# Patient Record
Sex: MALE | Race: ASIAN | Employment: UNEMPLOYED | URBAN - METROPOLITAN AREA
[De-identification: names, ages, dates, MRNs, and addresses within clinical notes are randomized per-mention and may not be internally consistent; named-entity substitution may affect disease eponyms.]

---

## 2023-05-20 ENCOUNTER — APPOINTMENT (OUTPATIENT)
Dept: CT IMAGING | Facility: HOSPITAL | Age: 23
End: 2023-05-20
Attending: EMERGENCY MEDICINE

## 2023-05-20 ENCOUNTER — HOSPITAL ENCOUNTER (EMERGENCY)
Facility: HOSPITAL | Age: 23
Discharge: HOME | End: 2023-05-20
Attending: EMERGENCY MEDICINE

## 2023-05-20 VITALS
SYSTOLIC BLOOD PRESSURE: 141 MMHG | OXYGEN SATURATION: 99 % | DIASTOLIC BLOOD PRESSURE: 72 MMHG | HEART RATE: 55 BPM | RESPIRATION RATE: 20 BRPM | TEMPERATURE: 99 F

## 2023-05-20 DIAGNOSIS — G40.909 SEIZURE DISORDER (HCC): Primary | ICD-10-CM

## 2023-05-20 LAB
ALBUMIN SERPL-MCNC: 4.9 G/DL (ref 3.4–5)
ALBUMIN/GLOB SERPL: 1.3 {RATIO} (ref 1–2)
ALP LIVER SERPL-CCNC: 117 U/L
ALT SERPL-CCNC: 70 U/L
ANION GAP SERPL CALC-SCNC: 20 MMOL/L (ref 0–18)
AST SERPL-CCNC: 29 U/L (ref 15–37)
BASOPHILS # BLD AUTO: 0.03 X10(3) UL (ref 0–0.2)
BASOPHILS NFR BLD AUTO: 0.3 %
BILIRUB SERPL-MCNC: 0.3 MG/DL (ref 0.1–2)
BUN BLD-MCNC: 21 MG/DL (ref 7–18)
CALCIUM BLD-MCNC: 9.6 MG/DL (ref 8.5–10.1)
CHLORIDE SERPL-SCNC: 107 MMOL/L (ref 98–112)
CO2 SERPL-SCNC: 11 MMOL/L (ref 21–32)
CREAT BLD-MCNC: 1.64 MG/DL
EOSINOPHIL # BLD AUTO: 0.15 X10(3) UL (ref 0–0.7)
EOSINOPHIL NFR BLD AUTO: 1.4 %
ERYTHROCYTE [DISTWIDTH] IN BLOOD BY AUTOMATED COUNT: 11.4 %
GFR SERPLBLD BASED ON 1.73 SQ M-ARVRAT: 60 ML/MIN/1.73M2 (ref 60–?)
GLOBULIN PLAS-MCNC: 3.8 G/DL (ref 2.8–4.4)
GLUCOSE BLD-MCNC: 133 MG/DL (ref 70–99)
GLUCOSE BLD-MCNC: 149 MG/DL (ref 70–99)
HCT VFR BLD AUTO: 54.2 %
HGB BLD-MCNC: 18 G/DL
IMM GRANULOCYTES # BLD AUTO: 0.11 X10(3) UL (ref 0–1)
IMM GRANULOCYTES NFR BLD: 1 %
LYMPHOCYTES # BLD AUTO: 5.27 X10(3) UL (ref 1–4)
LYMPHOCYTES NFR BLD AUTO: 48.4 %
MCH RBC QN AUTO: 31 PG (ref 26–34)
MCHC RBC AUTO-ENTMCNC: 33.2 G/DL (ref 31–37)
MCV RBC AUTO: 93.4 FL
MONOCYTES # BLD AUTO: 0.62 X10(3) UL (ref 0.1–1)
MONOCYTES NFR BLD AUTO: 5.7 %
NEUTROPHILS # BLD AUTO: 4.71 X10 (3) UL (ref 1.5–7.7)
NEUTROPHILS # BLD AUTO: 4.71 X10(3) UL (ref 1.5–7.7)
NEUTROPHILS NFR BLD AUTO: 43.2 %
OSMOLALITY SERPL CALC.SUM OF ELEC: 292 MOSM/KG (ref 275–295)
PLATELET # BLD AUTO: 351 10(3)UL (ref 150–450)
POTASSIUM SERPL-SCNC: 4 MMOL/L (ref 3.5–5.1)
PROT SERPL-MCNC: 8.7 G/DL (ref 6.4–8.2)
RBC # BLD AUTO: 5.8 X10(6)UL
SODIUM SERPL-SCNC: 138 MMOL/L (ref 136–145)
WBC # BLD AUTO: 10.9 X10(3) UL (ref 4–11)

## 2023-05-20 PROCEDURE — 80053 COMPREHEN METABOLIC PANEL: CPT | Performed by: EMERGENCY MEDICINE

## 2023-05-20 PROCEDURE — 96374 THER/PROPH/DIAG INJ IV PUSH: CPT

## 2023-05-20 PROCEDURE — 85025 COMPLETE CBC W/AUTO DIFF WBC: CPT | Performed by: EMERGENCY MEDICINE

## 2023-05-20 PROCEDURE — 99285 EMERGENCY DEPT VISIT HI MDM: CPT

## 2023-05-20 PROCEDURE — 82962 GLUCOSE BLOOD TEST: CPT

## 2023-05-20 PROCEDURE — 70450 CT HEAD/BRAIN W/O DYE: CPT | Performed by: EMERGENCY MEDICINE

## 2023-05-20 RX ORDER — LEVETIRACETAM 500 MG/5ML
INJECTION, SOLUTION, CONCENTRATE INTRAVENOUS
Status: COMPLETED
Start: 2023-05-20 | End: 2023-05-20

## 2023-05-20 RX ORDER — LEVETIRACETAM 750 MG/1
750 TABLET ORAL 2 TIMES DAILY
Qty: 60 TABLET | Refills: 0 | Status: SHIPPED | OUTPATIENT
Start: 2023-05-20 | End: 2023-06-19

## 2023-05-20 RX ORDER — LEVETIRACETAM 500 MG/5ML
1000 INJECTION, SOLUTION, CONCENTRATE INTRAVENOUS ONCE
Status: COMPLETED | OUTPATIENT
Start: 2023-05-20 | End: 2023-05-20

## 2023-05-20 NOTE — PROGRESS NOTES
Parents @ bedside. Per parents, patient took seizure medications from age 1 to age 10. Has not taken medications since age 10. Medication he took was called \"Depakine Chrono\". Mother states patient had a seizure one year ago. Patient does not have a neurologist in [facility]. Language line used for interpretion purposes.  #[id number]

## 2023-05-20 NOTE — ED INITIAL ASSESSMENT (HPI)
Patient to ER via EMS after a possible seizure in his car at the gym parking lot. Witnessed by bystander. Patient believes he might have taken too much pre-workout.

## 2023-06-07 ENCOUNTER — TELEPHONE (OUTPATIENT)
Dept: NEUROLOGY | Facility: CLINIC | Age: 23
End: 2023-06-07

## 2023-06-07 DIAGNOSIS — G40.909 SEIZURE DISORDER (HCC): Primary | ICD-10-CM

## 2023-06-07 RX ORDER — LEVETIRACETAM 750 MG/1
750 TABLET ORAL 2 TIMES DAILY
Qty: 60 TABLET | Refills: 0 | Status: SHIPPED | OUTPATIENT
Start: 2023-06-07 | End: 2023-07-07

## 2023-06-07 NOTE — TELEPHONE ENCOUNTER
Call placed to patients mother via   Ranjit Ferrera #[id number]  Left message for mother regarding testing and central scheduling number. Advised to call back with any questions.

## 2023-06-07 NOTE — TELEPHONE ENCOUNTER
Pts mom calling regarding medication. Pts mom states pt was put on Destiny Iha after having a seizure. Pt has no insurance and no PCP . Pt will run out of medication at the end of this month. Pt offered appt in [facility] on 6/28/23 and accepted.

## 2023-06-07 NOTE — TELEPHONE ENCOUNTER
PT mother states cost of medication can be too much out of pocket pt mother was given CS number to move forward wit MRI order

## 2023-06-28 ENCOUNTER — OFFICE VISIT (OUTPATIENT)
Dept: NEUROLOGY | Facility: CLINIC | Age: 23
End: 2023-06-28

## 2023-06-28 VITALS
SYSTOLIC BLOOD PRESSURE: 122 MMHG | HEART RATE: 64 BPM | DIASTOLIC BLOOD PRESSURE: 70 MMHG | WEIGHT: 209 LBS | RESPIRATION RATE: 16 BRPM

## 2023-06-28 DIAGNOSIS — G40.909 SEIZURE DISORDER (HCC): Primary | ICD-10-CM

## 2023-06-28 DIAGNOSIS — T88.7XXA MEDICATION SIDE EFFECT: ICD-10-CM

## 2023-06-28 PROCEDURE — 99244 OFF/OP CNSLTJ NEW/EST MOD 40: CPT | Performed by: OTHER

## 2023-06-28 RX ORDER — LEVETIRACETAM 500 MG/1
500 TABLET ORAL 2 TIMES DAILY
Qty: 60 TABLET | Refills: 1 | Status: SHIPPED | OUTPATIENT
Start: 2023-06-28

## 2023-09-07 RX ORDER — LEVETIRACETAM 500 MG/1
500 TABLET ORAL 2 TIMES DAILY
Qty: 60 TABLET | Refills: 0 | Status: SHIPPED | OUTPATIENT
Start: 2023-09-07

## 2023-09-07 NOTE — TELEPHONE ENCOUNTER
Medication: LEVETIRACETAM 500 MG Oral Tab     Date of last refill: 08/17/23 (60/0)  [place] 19[place]    Last office visit: 06/28/23  Due back to clinic per last office note:  6 months  Date next office visit scheduled:  No future appointments. Last OV note recommendation:     Plan:  Seizures since infancy, starting with a febrile seizure- initial frequency every 2-3 months, now yearly, but off seizure medication 15 years  Obtain EEG, MRI  Obtain Keppra level  Discussed that despite the fact that his seizure frequency has improved off seizure medications, the goal would be seizure cessation as much as possible   Due to irritability and drowsiness, with decreased appetite, recommend switch to Trileptal. He would not like to switch to a new medication at this time, and would like to try a lower dose of Keppra first. Will try 500mg BID.  Check level in 1 week  Recommend to not drive for 6 months  Discussed seizure precautions, including but not limited to no swimming alone, no baths, no climbing above own height, including ladders, avoid contact sports, wear helmet if biking, no operating heavy machinery  Discussed factors that can lower the seizure threshold

## 2023-10-16 RX ORDER — LEVETIRACETAM 500 MG/1
500 TABLET ORAL 2 TIMES DAILY
Qty: 60 TABLET | Refills: 0 | Status: SHIPPED | OUTPATIENT
Start: 2023-10-16 | End: 2023-11-20

## 2023-10-16 NOTE — TELEPHONE ENCOUNTER
Please see prior refill TE 9/7.   Needs appt and to keep it for further refills. Also did not complete testing.

## 2023-10-16 NOTE — TELEPHONE ENCOUNTER
Medication: LEVETIRACETAM 500 MG Oral Tab     Date of last refill: 09/07/23 (60/0)  [place]    Last office visit: 06/28/23  Due back to clinic per last office note:  6 months  Date next office visit scheduled:  No future appointments.     Last OV note recommendation:     Plan:  Seizures since infancy, starting with a febrile seizure- initial frequency every 2-3 months, now yearly, but off seizure medication 15 years  Obtain EEG, MRI  Obtain Keppra level  Discussed that despite the fact that his seizure frequency has improved off seizure medications, the goal would be seizure cessation as much as possible   Due to irritability and drowsiness, with decreased appetite, recommend switch to Trileptal. He would not like to switch to a new medication at this time, and would like to try a lower dose of Keppra first. Will try 500mg BID. Check level in 1 week  Recommend to not drive for 6 months  Discussed seizure precautions, including but not limited to no swimming alone, no baths, no climbing above own height, including ladders, avoid contact sports, wear helmet if biking, no operating heavy machinery  Discussed factors that can lower the seizure threshold

## 2023-10-17 NOTE — TELEPHONE ENCOUNTER
Patient will be due for follow up appointment at the end of December, but doesn't have anything scheduled.  Per provider request, contacted patient using the telephone  in [place] and explained that we need to schedule an appointment for him so we can continue to refill his seizure medication.  Patient understood but states that he needs to discuss with his parents, and requests that our office call him back tomorrow mid-morning (10/18/23 around 10:30am) to schedule.    Routing to  to call tomorrow to schedule.

## 2023-10-25 NOTE — TELEPHONE ENCOUNTER
Second attempt- Spoke with pt with [employer] pt did opt of of scheduling follow up appt per Te below pt stated he cannot afford another self pay visit and is requesting to be advised by nursing or DO for next best options.

## 2023-10-26 NOTE — TELEPHONE ENCOUNTER
Agree with health dept, [facility] if he cannot obtain insurance or some sort of assistance program, consider social work consult if that is available to him.     He has not completed blood test, MRI or EEG that I requested. I cannot refill his medication long term without seeing him.    I can give him a 3 month refill, and within the time frame, he is to call us back and let us know when his visit with the new clinic will be. At that time, I can give him a final refill until the appointment of his new clinic.

## 2023-11-01 NOTE — TELEPHONE ENCOUNTER
Utilized [place]  to contact patient.  Patient did not answer the phone, so message was left for patient to contact the office to discuss his medication refill, with office phone number and hours provided.    Davey to relay the following to patient when he calls back:    Dr. Burden provided a 1-month supply of patient's seizure medication, Levetiracetam, on 10/16/23.  She has agreed to provide one last 3-month supply (please request refill through the pharmacy), but she cannot continue to fill this medication if the patient will not following up with her.  She recommends contacting one of the following to get either financial assistance to continue being seen here, or get established with another provider.  Should the patient find another provider, he should contact our office once he has an appointment and doctor will provide a final refill until the appointment date.    Options include:  [facility] - offering free or discounted healthcare for patient with no insurance - [phone]    [facility] Financial Assistance - call [phone] - they can mail him a copy of the financial assistance policy in [place].

## 2023-11-19 DIAGNOSIS — G40.909 SEIZURE DISORDER (HCC): Primary | ICD-10-CM

## 2023-11-20 RX ORDER — LEVETIRACETAM 500 MG/1
500 TABLET ORAL 2 TIMES DAILY
Qty: 60 TABLET | Refills: 2 | Status: SHIPPED | OUTPATIENT
Start: 2023-11-20

## 2023-11-20 NOTE — TELEPHONE ENCOUNTER
Medication: LEVETIRACETAM 500 MG Oral Tab      Date of last refill: 10/16/2023 (#60/0)  [place]     Last office visit: 06/28/2023  Due back to clinic per last office note:  Around 12/28/2023  Date next office visit scheduled:    No future appointments. Last OV note recommendation:    ASSESSMENT/ACTIVE PROBLEM LIST:   Seizure disorder (hcc)  (primary encounter diagnosis)  Medication side effect     Discussion/Plan:  Seizures since infancy, starting with a febrile seizure- initial frequency every 2-3 months, now yearly, but off seizure medication 15 years  Obtain EEG, MRI  Obtain Keppra level  Discussed that despite the fact that his seizure frequency has improved off seizure medications, the goal would be seizure cessation as much as possible   Due to irritability and drowsiness, with decreased appetite, recommend switch to Trileptal. He would not like to switch to a new medication at this time, and would like to try a lower dose of Keppra first. Will try 500mg BID.  Check level in 1 week  Recommend to not drive for 6 months  Discussed seizure precautions, including but not limited to no swimming alone, no baths, no climbing above own height, including ladders, avoid contact sports, wear helmet if biking, no operating heavy machinery  Discussed factors that can lower the seizure threshold

## 2023-11-21 NOTE — TELEPHONE ENCOUNTER
Please see last refill TE- this is last refill if patient will not be following up with me- likely complicated case due to imigration/lack of insurance. Recommend that re-attempt at contacting patient be done. If patient cannot be reached to acknowlegde this information, I will have to continue to re-evaluate his case/make sure he is not without a refill, due to seizure disorder.

## 2024-01-16 NOTE — TELEPHONE ENCOUNTER
Tracking Number:    [id number]    Copy Add to Informed Delivery  Latest Update  Your item was picked up at the post office at 1:50 pm on December 18, 2023 [place] [address].    Get More Out of USPS Tracking:  USPS Tracking Plus®  Delivered    Delivered, Individual [place] [address]     December 18, 2023, 1:50 pm

## (undated) NOTE — LETTER
? 2023? ????? 28  !???????? ,????????????   ? ?????? ,???????? ??????? ?????????? ?? ???????? ????????? ???????? ??   ??? ,????????? ????????????????? ???????? ????? ? ????? ,????? ?????????   ??????? ?????? ?? ?? ??? ,???????????? ????????????? .???? ??? ???????? ??????????   ???????? ?????????? ??? ????????? ?? .?????????? ?? ????????? ???????????? ??   ????? ?? ????? ????????? ? .???????????? ???????? ????? ,??? ?? ???????????   ?? ???? ??? ?????? ,??????? ????? ?????????- 3? ?????????? ??? ????????? ????????????   ? ,???????????? ????????? ????????? ? ??? ????????? ????????????? ?????????? ?????   ?????? ?????? ?? ?? ???? .???????????? ? ???????? ???????????? ???????????? ?????   ???? ?? ???????? ? ????? ?????????? ??????? ? ?????????? ,????? ????? ? ????????????   ?? ???? ,??? ??? ?? ??????? ?? ??????????? ????? ????? ????????? ????? ?? ????? ,???   .???????????? ?? ??????? ??   ???????????? ??????? ? ?????? ???????? ? ???????????? ?????? ?????? ????? ??   .????????? ??? ??? ? ???? ???? ,[phone] ([employer] ??? ???????????????  ????????? ? [facility] Financial Assistance ?? ?????? [phone].    ??????? ?? ?????? ?????????? ???????? ????? ????? ?? ????????? ????? ??????????? ?€   .?????   ??? ???? ????? .?????? 3 ?? ?????? ??? .2023.11.20 ?????? ? ????????? ??? ?????????????   ???????? ?? ?? ???? ,???????????? ????? ?? ?????? ??? ,?????????? ????????? ???  .????????????  ,?????????         Mayela Burden, DO  Neurology  [facility]